# Patient Record
Sex: FEMALE | Race: BLACK OR AFRICAN AMERICAN | NOT HISPANIC OR LATINO | Employment: UNEMPLOYED | URBAN - METROPOLITAN AREA
[De-identification: names, ages, dates, MRNs, and addresses within clinical notes are randomized per-mention and may not be internally consistent; named-entity substitution may affect disease eponyms.]

---
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
## 2022-06-01 ENCOUNTER — HOSPITAL ENCOUNTER (EMERGENCY)
Facility: OTHER | Age: 22
Discharge: HOME | End: 2022-06-01
Attending: EMERGENCY MEDICINE
Payer: MEDICAID

## 2022-06-01 VITALS
SYSTOLIC BLOOD PRESSURE: 101 MMHG | BODY MASS INDEX: 23.22 KG/M2 | OXYGEN SATURATION: 100 % | DIASTOLIC BLOOD PRESSURE: 54 MMHG | TEMPERATURE: 98 F | WEIGHT: 136 LBS | HEART RATE: 87 BPM | RESPIRATION RATE: 18 BRPM | HEIGHT: 64 IN

## 2022-06-01 DIAGNOSIS — R55 NEAR SYNCOPE: ICD-10-CM

## 2022-06-01 DIAGNOSIS — J06.9 VIRAL URI WITH COUGH: Primary | ICD-10-CM

## 2022-06-01 LAB
B-HCG UR QL: NEGATIVE
CTP QC/QA: YES
GROUP A STREP, MOLECULAR: NEGATIVE
POC MOLECULAR INFLUENZA A AGN: NEGATIVE
POC MOLECULAR INFLUENZA B AGN: NEGATIVE
SARS-COV-2 RDRP RESP QL NAA+PROBE: NEGATIVE

## 2022-06-01 PROCEDURE — 93010 ELECTROCARDIOGRAM REPORT: CPT | Mod: ,,, | Performed by: INTERNAL MEDICINE

## 2022-06-01 PROCEDURE — 93010 ELECTROCARDIOGRAM REPORT: CPT | Mod: 59,76,, | Performed by: INTERNAL MEDICINE

## 2022-06-01 PROCEDURE — 99283 EMERGENCY DEPT VISIT LOW MDM: CPT | Mod: 25

## 2022-06-01 PROCEDURE — 81025 URINE PREGNANCY TEST: CPT | Performed by: PHYSICIAN ASSISTANT

## 2022-06-01 PROCEDURE — U0002 COVID-19 LAB TEST NON-CDC: HCPCS | Performed by: PHYSICIAN ASSISTANT

## 2022-06-01 PROCEDURE — 93005 ELECTROCARDIOGRAM TRACING: CPT

## 2022-06-01 PROCEDURE — 87651 STREP A DNA AMP PROBE: CPT | Performed by: PHYSICIAN ASSISTANT

## 2022-06-01 PROCEDURE — 93010 EKG 12-LEAD: ICD-10-PCS | Mod: 59,76,, | Performed by: INTERNAL MEDICINE

## 2022-06-01 RX ORDER — BENZONATATE 100 MG/1
100 CAPSULE ORAL 3 TIMES DAILY PRN
Qty: 20 CAPSULE | Refills: 0 | Status: SHIPPED | OUTPATIENT
Start: 2022-06-01 | End: 2022-06-11

## 2022-06-01 NOTE — ED PROVIDER NOTES
"SCRIBE #1 NOTE: I, Justice Solis, am scribing for, and in the presence of, Saulo Blanco DO.   SCRIBE #2 NOTE: I, Rowan Johnson, am scribing for, and in the presence of, Saulo Blanco DO.       Source of History:  The patient.    Chief complaint:  Sore Throat, COVID-19 Concerns, and Cough (Pt c.o cough, sore throat, sinus congestion onset today.  Pt denies fever at home.  AAO x 3 nadn skin w.d  throat mildly red with mild exudate.  Pt states she passed out around 3 pm. Pt states she did not go completely out but she couldn't see anything. )      HPI:  Magdy Mcnamara is a 22 y.o. female presenting with sore throat x 3 days. She also reports cough, congestion, fatigue and chills. She denies fever or shortness of breath. She denies any contact with other sick individuals.     This is the extent to the patients complaints today here in the emergency department.    ROS: As per HPI and below:  Constitutional: +Chills. +Fatigue. No fever.  Eyes: No visual changes.  ENT: +Sore throat. +Cough. +Congestion.  Cardiovascular: No chest pain.  Respiratory: No shortness of breath   GI: No abdominal pain.  No nausea or vomiting.  Genito-Urinary: No abnormal urination.  Neurologic: No focal weakness.  No numbness.   MSK: no back pain.  Integument: No rashes or lesions.  Hematologic: No easy bruising.  Endocrine: No excessive thirst or urination.    Review of patient's allergies indicates:  No Known Allergies    PMH:  As per HPI and below:  No past medical history on file.  No past surgical history on file.    Social History     Tobacco Use    Smoking status: Never Smoker    Smokeless tobacco: Never Used   Substance Use Topics    Alcohol use: No       Physical Exam:    BP (!) 101/54   Pulse 87   Temp 98.4 °F (36.9 °C)   Resp 18   Ht 5' 4" (1.626 m)   Wt 61.7 kg (136 lb)   SpO2 100%   BMI 23.34 kg/m²   Nursing note and vital signs reviewed.  Constitutional: No acute distress.  Nontoxic  Eyes: No conjunctival " injection.  Extraocular muscles are intact.  ENT: Oropharynx clear. No tonisillar exudate or swelling. Uvula midline and normal. No elevation of posterior oropharynx.  Nasal congestion with mucosal edema  Cardiovascular: Regular rate and rhythm.  No murmurs. No gallops. No rubs.  Respiratory: Clear to auscultation bilaterally.  Good air movement.  No wheezes.  No rhonchi. No rales. No accessory muscle use.  Abdomen: Soft.  Not distended.  Nontender.  No guarding.  No rebound. Non-peritoneal.  Musculoskeletal: Good range of motion all joints.  No deformities.  Neck supple.  No meningismus.  Skin: No rashes seen.  Good turgor.  No abrasions.  No ecchymoses.  Neuro: alert and oriented x3,  no focal neurological deficits.  Psych: Appropriate, conversant    Labs that have been ordered have been independently reviewed and interpreted by myself.    I decided to obtain the patient's medical records.  Summary of Medical Records:      MDM/ Differential Dx:  22 y.o. female with symptoms suggestive of a viral etiology.  Pending influenza and COVID.  Does states she had some near syncopal episodes.  Will also get a pregnancy test and EKG.  Do not feel any blood work is indicated at this time.    ED Course as of 06/01/22 1839   Wed Jun 01, 2022   1744 POC Molecular Influenza A Ag: Negative [SM]   1744 POC Molecular Influenza B Ag: Negative [SM]   1744 SARS-CoV-2 RNA, Amplification, Qual: Negative [SM]   1744 Preg Test, Ur: Negative [SM]   1744 Group A Strep, Molecular: Negative [SM]   1811 EKG 12-lead  EKG independently interpreted by myself shows normal sinus rhythm at a rate of 90, normal intervals, narrow QRS, no acute ST T wave abnormalities.  Overall impression normal EKG.     [SM]   1811 I do not feel any further workup is indicated in the emergency department today.  I updated pt regarding results and I counseled pt regarding supportive care measures.  Diagnosis and treatment plan explained to patient. I have answered all  questions and the patient is satisfied with the plan of care. Patient discharged home in stable condition.  [SM]      ED Course User Index  [SM] Saulo Blanco DO                Scribe Attestation:   Scribe #1: I performed the above scribed service and the documentation accurately describes the services I performed. I attest to the accuracy of the note.  Scribe #2: I performed the above scribed service and the documentation accurately describes the services I performed. I attest to the accuracy of the note.    Physician Attestation for Scribe: I, Dr Saulo Blanco reviewed documentation as scribed in my presence, which is both accurate and complete.    Diagnostic Impression:    1. Viral URI with cough    2. Near syncope         ED Disposition Condition    Discharge Stable          ED Prescriptions     Medication Sig Dispense Start Date End Date Auth. Provider    benzonatate (TESSALON) 100 MG capsule Take 1 capsule (100 mg total) by mouth 3 (three) times daily as needed for Cough. 20 capsule 6/1/2022 6/11/2022 Saulo Blanco DO        Follow-up Information     Follow up With Specialties Details Why Contact Info    [facility]   As needed [address] [facility] [address]  [phone]             Saulo Blanco DO  06/01/22 3787

## 2022-06-01 NOTE — Clinical Note
"Magdy Allison" Naima was seen and treated in our emergency department on 6/1/2022.  She may return to work on 06/03/2022.       If you have any questions or concerns, please don't hesitate to call.      Saulo Blanco, DO"

## 2022-06-01 NOTE — ED TRIAGE NOTES
Pt presents to ED c/o sinus congestion, sore throat, and cough onset today. Denies fever at home. Pt also states she fell around 3 pm today. States that she did not fully lose consciousness, but began seeing tunnel vision and ears started ringing. Reports that she still feels dizzy if standing up too fast.

## 2023-01-12 NOTE — FIRST PROVIDER EVALUATION
Emergency Department TeleTriage Encounter Note      CHIEF COMPLAINT    Chief Complaint   Patient presents with    Sore Throat    COVID-19 Concerns    Cough     Pt c.o cough, sore throat, sinus congestion onset today.  Pt denies fever at home.  AAO x 3 nadn skin w.d  throat mildly red with mild exudate.  Pt states she passed out around 3 pm. Pt states she did not go completely out but she couldn't see anything.        VITAL SIGNS   Initial Vitals [06/01/22 1643]   BP Pulse Resp Temp SpO2   (!) 107/55 109 18 98.4 °F (36.9 °C) 99 %      MAP       --            ALLERGIES    Review of patient's allergies indicates:  No Known Allergies    PROVIDER TRIAGE NOTE  This is a teletriage evaluation of a 22 y.o. female presenting to the ED complaining of URI symptoms. Patient reports sore throat, cough, congestion, and headache for the past 3 days. She had near syncopal episode today.     Patient is alert and oriented during teletriage. She is sitting upright in chair. She answers questions appropriately.     Initial orders will be placed and care will be transferred to an alternate provider when patient is roomed for a full evaluation. Any additional orders and the final disposition will be determined by that provider.         ORDERS  Labs Reviewed   INFLUENZA A & B BY MOLECULAR   GROUP A STREP, MOLECULAR   SARS-COV-2 RDRP GENE   POCT URINE PREGNANCY       ED Orders (720h ago, onward)    Start Ordered     Status Ordering Provider    06/01/22 1649 06/01/22 1648  POCT COVID-19 Rapid Screening  Once         Ordered JOSÉ ALBERTO    06/01/22 1649 06/01/22 1648  Airborne and Contact and Droplet Isolation Status  Continuous         Ordered JOSÉ ALBERTO    06/01/22 1649 06/01/22 1648  Influenza A & B by Molecular  STAT         Ordered JOSÉ ALBERTO    06/01/22 1649 06/01/22 1648  Group A Strep, Molecular  STAT         Ordered JOSÉ ALBERTO    06/01/22 1649 06/01/22 1648  POCT urine pregnancy  Once         Ordered REMY  ----- Message from Alex Reynoso sent at 1/12/2023  2:03 PM EST -----  Subject: Message to Provider    QUESTIONS  Information for Provider? Pt states he had an appt with Therese he is trying   to reschedule and is unsure of where she moved to, he was provided with a   phone number that is no longer in service.   ---------------------------------------------------------------------------  --------------  CALL BACK INFO  [phone]; Do not leave any message, patient will call back for answer  ---------------------------------------------------------------------------  --------------  SCRIPT ANSWERS  Relationship to Patient? Self   JOSÉ NOONAN    06/01/22 1649 06/01/22 1649  EKG 12-lead  Once         Ordered JOSÉ ALBERTO            Virtual Visit Note: The provider triage portion of this emergency department evaluation and documentation was performed via [employer], a HIPAA-compliant telemedicine application, in concert with a tele-presenter in the room. A face to face patient evaluation with one of my colleagues will occur once the patient is placed in an emergency department room.      DISCLAIMER: This note was prepared with [employer] voice recognition transcription software. Garbled syntax, mangled pronouns, and other bizarre constructions may be attributed to that software system.

## 2023-02-07 ENCOUNTER — TELEPHONE (OUTPATIENT)
Dept: OBSTETRICS AND GYNECOLOGY | Facility: CLINIC | Age: 23
End: 2023-02-07
Payer: MEDICAID

## 2023-02-07 DIAGNOSIS — Z30.017 ENCOUNTER FOR INITIAL PRESCRIPTION OF IMPLANTABLE SUBDERMAL CONTRACEPTIVE: Primary | ICD-10-CM

## 2023-02-07 NOTE — TELEPHONE ENCOUNTER
----- Message from Andi Albrecth sent at 2/7/2023  4:08 PM CST -----  Patient called to get an appointment to get Nexplanon replaced.    Patient can be reached at [phone]

## 2023-02-17 ENCOUNTER — PROCEDURE VISIT (OUTPATIENT)
Dept: OBSTETRICS AND GYNECOLOGY | Facility: CLINIC | Age: 23
End: 2023-02-17
Payer: MEDICAID

## 2023-02-17 VITALS
WEIGHT: 152.13 LBS | SYSTOLIC BLOOD PRESSURE: 101 MMHG | BODY MASS INDEX: 26.11 KG/M2 | DIASTOLIC BLOOD PRESSURE: 62 MMHG

## 2023-02-17 DIAGNOSIS — Z30.46 ENCOUNTER FOR REMOVAL AND REINSERTION OF NEXPLANON: Primary | ICD-10-CM

## 2023-02-17 PROCEDURE — 11983 REMOVE/INSERT DRUG IMPLANT: CPT | Mod: S$PBB,,, | Performed by: OBSTETRICS & GYNECOLOGY

## 2023-02-17 PROCEDURE — 11983 REMOVE/INSERT DRUG IMPLANT: CPT | Mod: PBBFAC,PN | Performed by: OBSTETRICS & GYNECOLOGY

## 2023-02-17 PROCEDURE — 11983 PR REMOVAL W/ REINSERT DRUG IMPLANT DEVICE: ICD-10-PCS | Mod: S$PBB,,, | Performed by: OBSTETRICS & GYNECOLOGY

## 2023-02-17 RX ADMIN — ETONOGESTREL 68 MG: 68 IMPLANT SUBCUTANEOUS at 08:02

## 2023-02-17 NOTE — PROCEDURES
Removal of Nexplanon Device    Date/Time: 2/17/2023 8:45 AM  Performed by: Shruthi Deluca MD  Authorized by: Shruthi Deluca MD   Preparation: Patient was prepped and draped in the usual sterile fashion.  Local anesthesia used: yes  Anesthesia: local infiltration    Anesthesia:  Local anesthesia used: yes  Local Anesthetic: lidocaine 1% with epinephrine  Anesthetic total: 3 mL    Sedation:  Patient sedated: no    Patient tolerance: patient tolerated the procedure well with no immediate complications  Comments: Removed without difficulty       Insertion of Nexplanon Device    Date/Time: 2/17/2023 8:45 AM  Performed by: Shruthi Deluca MD  Authorized by: Shruthi Deluca MD     Consent obtained:  Written  Consent given by:  Patient  Patient questions answered: yes    Patient agrees, verbalizes understanding, and wants to proceed: yes    Instructions and paperwork completed: yes    Prepped with: alcohol 70% and povidone-iodine    Local anesthetic:  Lidocaine 1%  The site was cleaned and prepped in a sterile fashion: yes    Left/right:  Left   68 mg etonogestreL 68 mg  Preloaded Implanon trocar was placed subdermally: yes    Visualization of implant was obtained: yes    Nexplanon was inserted and trocar removed: yes    Visualization of notch in stilette and palpitation of device: yes    Palpitation confirms placement by provider and patient: yes     Pressure dressing applied. Pt instructed to remove after 24 hours    Shruthi Deluca MD  OBGYN PGY-2    I was present for and supervised the entire procedure.  -- KATEY Montes M.D.    [id number]  4/9/2025  BUY AND BILL

## 2025-03-24 NOTE — PLAN OF CARE
SOCIAL WORK DISCHARGE PLANNING ASSESSMENT    Sw completed discharge planning assessment with ptat pt's bedside.  Pt was easily engaged. Education on the role of  was provided. Emotional support provided throughout assessment.      Mother: Magdy Mcnamara, 2002  Address[place] [address]  Phone: [phone]  Employer: unemployed    Job Title: n/a  Education: currently in high school       Father: Shane Monreal  Address: [facility]'s info is unknown to sw.    Support person(s): Valeria Ravinder (mom) [phone]    [facility] ([place] Primary: Yes Secondary: No   [facility]      Pediatrician: unsure at this time but will discuss with her mom and make a decision.       Nutrition: Formula    Breast Pump:   N/A    WIC:   Mom not certified; however will apply for . Provided mom with a WIC site clinic list.       Essential Items: (includes car seat, crib/bassinet/pack-n-play, clothing, bottles, diapers, etc.)  Acquired     Transportation: Family/friends     Resources Given: Parents as Teacher but pt declined referral.    Potential Discharge Needs:  None     Courtney Lafont, LCSW [facility]  [email or web address]    (phone) [phone] or  [address] [phone]  (fax) [phone]           [Follow-Up: _____] : a [unfilled] follow-up visit [Family Member] : family member
